# Patient Record
(demographics unavailable — no encounter records)

---

## 2024-10-18 NOTE — DISCUSSION/SUMMARY
[FreeTextEntry1] : REMIGIO presents today with an ear infection and will continue his antibiotic course.

## 2024-10-18 NOTE — HISTORY OF PRESENT ILLNESS
[FreeTextEntry6] : REMIGIO presents today with an urgent care follow up. He was diagnosed with a ear infection and was prescribed Amoxicillin

## 2024-10-18 NOTE — PHYSICAL EXAM
[Acute Distress] : no acute distress [Alert] : alert [Normocephalic] : normocephalic [EOMI] : grossly EOMI [Clear] : right tympanic membrane not clear [Erythema] : erythema [Bulging] : not bulging [Retracted] : not retracted [Pink Nasal Mucosa] : pink nasal mucosa [Erythematous Oropharynx] : nonerythematous oropharynx

## 2024-10-18 NOTE — BEGINNING OF VISIT
Spoke with Ernesto's mom and scheduled him for his cardiac MRI w/o contrast at [facility] on 6/10/20 at 1030am. Provided address and instructions. Informed mom to call me directly with any questions () and a reminder call will be given 1-2 days prior. Mom v/u and agreed.    [Mother] : mother

## 2024-10-23 NOTE — PHYSICAL EXAM
[Alert] : alert [Normocephalic] : normocephalic [Flat Open Anterior [place]] : flat open anterior fontanelle [Red Reflex Bilateral] : red reflex bilateral [Normally Placed Ears] : normally placed ears [Clear Tympanic membranes with present light reflex and bony landmarks] : clear tympanic membranes with present light reflex and bony landmarks [No Discharge] : no discharge [Palate Intact] : palate intact [Tooth Eruption] : tooth eruption  [Supple, full passive range of motion] : supple, full passive range of motion [Clear to Auscultation Bilaterally] : clear to auscultation bilaterally [Regular Rate and Rhythm] : regular rate and rhythm [S1, S2 present] : S1, S2 present [No Murmurs] : no murmurs [Soft] : soft [Normoactive Bowel Sounds] : normoactive bowel sounds [Chele 1] : Chele 1 [Circumcised] : circumcised [Testicles Descended Bilaterally] : testicles descended bilaterally [Patent] : patent [No Abnormal Lymph Nodes Palpated] : no abnormal lymph nodes palpated [No Spinal Dimple] : no spinal dimple [Cranial Nerves Grossly Intact] : cranial nerves grossly intact [No Rash or Lesions] : no rash or lesions [FreeTextEntry3] : TMS CLEAR [de-identified] : 16 TEETH [de-identified] : NORMAL GAIT

## 2024-10-23 NOTE — DISCUSSION/SUMMARY
[Normal Growth] : growth [Normal Development] : development [No Elimination Concerns] : elimination [No Feeding Concerns] : feeding [No Skin Concerns] : skin [Normal Sleep Pattern] : sleep [Family Support] : family support [Child Development and Behavior] : child development and behavior [Language Promotion/Hearing] : language promotion/hearing [Toliet Training Readiness] : toliet training readiness [Safety] : safety [No Medications] : ~He/She~ is not on any medications [Mother] : mother [Father] : father [de-identified] : DISCUSSED WEANING  [FreeTextEntry1] : DEFERS VACCINES TODAY [de-identified] : NONE [FreeTextEntry3] : WELL CARE AT AGE 2 YEARS

## 2024-10-23 NOTE — HISTORY OF PRESENT ILLNESS
[Parents] : parents [Normal] : Normal [Sippy cup use] : Sippy cup use [None] : Primary Fluoride Source: None [Ready for Toilet Training] : ready for toilet training [No] : Not at  exposure [Car seat in back seat] : Car seat in back seat [Up to date] : Up to date [NO] : No [FreeTextEntry7] : LAST WELL AT 1 YEAR  NO CONCERNS RECENTLY TREATED FOR OM [de-identified] : HEALTHY DIET TRYING TO SELF FEED  NURSING [FreeTextEntry8] : REG BMS [FreeTextEntry3] : WAKES FREQUENTLY FOR MILK

## 2024-10-23 NOTE — DEVELOPMENTAL MILESTONES
[Normal Development] : Normal Development [None] : none [Engages with others for play] : engages with others for play [Points to object of interest to] : points to object of interest to draw attention to it [Turns and looks at adult if] : turns and looks at adult if something new happens [Begins to scoop with spoon] : begins to scoop with spoon [Uses 6 to 10 words other than] : uses 6 to 10 words other than names [Identifies at least 2 body parts] : identifies at least 2 body parts [Walks up with 2 feet per step] : walks up with 2 feet per step with hand held [Sits in small chair] : sits in small chair [Carries toy while walking] : carries toy while walking [Scribbles spontaneously] : scribbles spontaneously [Throws small ball a few feet] : throws a small ball a few feet while standing [Passed] : passed [Yes] : Completed. [FreeTextEntry1] : SCORE 20

## 2025-03-28 NOTE — HISTORY OF PRESENT ILLNESS
[Father] : father [Mother] : mother [Normal] : Normal [In bed] : In bed [Sippy cup use] : Sippy cup use [Brushing teeth] : Brushing teeth [Toothpaste] : Primary Fluoride Source: Toothpaste [Playtime 60 min a day] : Playtime 60 min a day [Toilet Training] : Toilet training [No] : No cigarette smoke exposure [Yes] : At  exposure [Car seat in back seat] : Car seat in back seat [Delayed] : Delayed [FreeTextEntry7] : LAST WELL AT 18 MONTHS [de-identified] : WILL BE STARTING DAY CARE BY SUMMER , WEANING FROM THE BREAST [de-identified] : ALL FOODS STILL NURSING AT NIGHT  [FreeTextEntry8] : TRAINED FOR URINE  BMS DAILY [FreeTextEntry3] : COSLEEPING WAKES TO NURSE [de-identified] : CBC AND LEAD SENT TODAY [de-identified] : PREFERS TO SCHEDULE  [NO] : No

## 2025-03-28 NOTE — DEVELOPMENTAL MILESTONES
[Normal Development] : Normal Development [None] : none [Uses 50 words] : uses 50 words [Combine 2 words into phrase or] : combines 2 words into phrase or sentences [Follows 2-step command] : follows 2-step command [Uses words that are 50% intelligible] : uses words that are 50% intelligible to strangers [Kicks ball] : kicks ball  [Jumps off ground with 2 feet] : jumps off ground with 2 feet [Runs with coordination] : runs with coordination [Climbs up a ladder at a] : climbs up a ladder at a playground [Stacks objects] : stacks objects [Turns book pages] : turns book pages [Uses hands to turn objects] : uses hands to turn objects [Yes] : Completed. [FreeTextEntry1] : PASSED SCORE 20

## 2025-03-28 NOTE — DISCUSSION/SUMMARY
[Normal Growth] : growth [Normal Development] : development [No Elimination Concerns] : elimination [No Feeding Concerns] : feeding [No Skin Concerns] : skin [Normal Sleep Pattern] : sleep [Assessment of Language Development] : assessment of language development [Temperament and Behavior] : temperament and behavior [Toilet Training] : toilet training [TV Viewing] : tv viewing [Safety] : safety [No Medication Changes] : No medication changes at this time [Mother] : mother [Father] : father [de-identified] : DISCUSSED WEANING, DIETARY SOURCES OF VIT D [FreeTextEntry1] : CBC AND LEAD SENT TODAY.  DISCUSSED REMAINING REQUIRED VACCINES WILL SCHEDULE IN MAY FOR HIB/PREVNAR/DTAP  [de-identified] : NONE [FreeTextEntry3] : WELL CARE IN 6 MONTHS

## 2025-03-28 NOTE — PHYSICAL EXAM
[Alert] : alert [Normocephalic] : normocephalic [Clear Tympanic membranes with present light reflex and bony landmarks] : clear tympanic membranes with present light reflex and bony landmarks [No Discharge] : no discharge [Palate Intact] : palate intact [Tooth Eruption] : tooth eruption  [Supple, full passive range of motion] : supple, full passive range of motion [Clear to Auscultation Bilaterally] : clear to auscultation bilaterally [Regular Rate and Rhythm] : regular rate and rhythm [S1, S2 present] : S1, S2 present [No Murmurs] : no murmurs [Soft] : soft [Normoactive Bowel Sounds] : normoactive bowel sounds [Chele 1] : Chele 1 [Uncircumcised] : uncircumcised [Testicles Descended Bilaterally] : testicles descended bilaterally [Patent] : patent [No Abnormal Lymph Nodes Palpated] : no abnormal lymph nodes palpated [No Spinal Dimple] : no spinal dimple [Cranial Nerves Grossly Intact] : cranial nerves grossly intact [No Rash or Lesions] : no rash or lesions [FreeTextEntry5] : PASSED PHOTO SCREEN [FreeTextEntry3] : GROSSLY NORMAL [de-identified] : 16 TEETH [de-identified] : NORMAL GAIT